# Patient Record
Sex: FEMALE | Race: WHITE | NOT HISPANIC OR LATINO | Employment: OTHER | URBAN - METROPOLITAN AREA
[De-identification: names, ages, dates, MRNs, and addresses within clinical notes are randomized per-mention and may not be internally consistent; named-entity substitution may affect disease eponyms.]

---

## 2022-06-12 ENCOUNTER — OFFICE VISIT (OUTPATIENT)
Dept: URGENT CARE | Age: 62
End: 2022-06-12
Payer: COMMERCIAL

## 2022-06-12 VITALS
DIASTOLIC BLOOD PRESSURE: 92 MMHG | SYSTOLIC BLOOD PRESSURE: 146 MMHG | OXYGEN SATURATION: 98 % | TEMPERATURE: 98.1 F | HEIGHT: 62 IN | HEART RATE: 84 BPM | BODY MASS INDEX: 38.46 KG/M2 | RESPIRATION RATE: 20 BRPM | WEIGHT: 209 LBS

## 2022-06-12 DIAGNOSIS — J20.9 ACUTE BRONCHITIS, UNSPECIFIED ORGANISM: Primary | ICD-10-CM

## 2022-06-12 PROCEDURE — 99213 OFFICE O/P EST LOW 20 MIN: CPT

## 2022-06-12 RX ORDER — LIOTHYRONINE SODIUM 5 UG/1
TABLET ORAL
COMMUNITY
Start: 2022-03-05

## 2022-06-12 RX ORDER — AMLODIPINE BESYLATE 5 MG/1
TABLET ORAL
COMMUNITY
Start: 2022-04-17

## 2022-06-12 RX ORDER — LAMOTRIGINE 150 MG/1
TABLET ORAL
COMMUNITY
Start: 2022-03-10

## 2022-06-12 RX ORDER — FLUTICASONE PROPIONATE 50 MCG
1 SPRAY, SUSPENSION (ML) NASAL DAILY
COMMUNITY

## 2022-06-12 RX ORDER — ALBUTEROL SULFATE 90 UG/1
2 AEROSOL, METERED RESPIRATORY (INHALATION) EVERY 6 HOURS PRN
COMMUNITY

## 2022-06-12 RX ORDER — LEVOTHYROXINE SODIUM 0.1 MG/1
TABLET ORAL
COMMUNITY
Start: 2022-05-08

## 2022-06-12 RX ORDER — AZITHROMYCIN 250 MG/1
TABLET, FILM COATED ORAL
Qty: 6 TABLET | Refills: 0 | Status: SHIPPED | OUTPATIENT
Start: 2022-06-12 | End: 2022-06-16

## 2022-06-12 RX ORDER — PREDNISONE 20 MG/1
20 TABLET ORAL DAILY
Qty: 5 TABLET | Refills: 0 | Status: SHIPPED | OUTPATIENT
Start: 2022-06-12 | End: 2022-06-17

## 2022-06-12 NOTE — PROGRESS NOTES
[facility]        NAME: Katarzyna Lutz is a 58 y o  female  : 1960    MRN: [id number]  DATE: 2022  TIME: 10:06 AM    Assessment and Plan   Acute bronchitis, unspecified organism [J20 9]  1  Acute bronchitis, unspecified organism  azithromycin (ZITHROMAX) 250 mg tablet    predniSONE 20 mg tablet         Patient Instructions     Steroids and antibiotics as discussed  Follow up with PCP in 3-5 days  Proceed to  ER if symptoms worsen  Chief Complaint     Chief Complaint   Patient presents with    Cough     Patient been sick since Wednesday with a cough- been using her inhaler more often- denies fever but has post nasal drip         History of Present Illness       Patient presenting for evaluation of worsening cough  Patient states that she began with a cough 5 days ago, and it has become productive of green sputum  Patient states that she has taken at home COVID test, and has negative test results  She denies any chest pain, shortness of breath, fevers or chills, but states that she has been having chest tightness and cold sweats  Patient states that she is diagnosed with early-onset COPD, and has a history of smoking  She states that she is no longer a smoker  She states that she has been taking albuterol for her chest tightness and wheezing, and is providing relief of the symptoms  She denies any relieving factors of her cough  Cough  Associated symptoms include chills (Cold sweats) and wheezing  Pertinent negatives include no chest pain, ear pain, fever, rash, sore throat or shortness of breath  Review of Systems   Review of Systems   Constitutional: Positive for chills (Cold sweats)  Negative for fever  HENT: Negative for ear pain and sore throat  Eyes: Negative for pain and visual disturbance  Respiratory: Positive for cough, chest tightness and wheezing  Negative for shortness of breath  Cardiovascular: Negative for chest pain and palpitations  Gastrointestinal: Negative for abdominal pain and vomiting  Genitourinary: Negative for dysuria and hematuria  Musculoskeletal: Negative for arthralgias and back pain  Skin: Negative for color change and rash  Neurological: Negative for seizures and syncope  All other systems reviewed and are negative  Current Medications       Current Outpatient Medications:     albuterol (PROVENTIL HFA,VENTOLIN HFA) 90 mcg/act inhaler, Inhale 2 puffs every 6 (six) hours as needed for wheezing, Disp: , Rfl:     azithromycin (ZITHROMAX) 250 mg tablet, Take 2 tablets today then 1 tablet daily x 4 days, Disp: 6 tablet, Rfl: 0    fluticasone (FLONASE) 50 mcg/act nasal spray, 1 spray into each nostril daily, Disp: , Rfl:     predniSONE 20 mg tablet, Take 1 tablet (20 mg total) by mouth daily for 5 days, Disp: 5 tablet, Rfl: 0    amLODIPine (NORVASC) 5 mg tablet, , Disp: , Rfl:     lamoTRIgine (LaMICtal) 150 MG tablet, , Disp: , Rfl:     levothyroxine 100 mcg tablet, , Disp: , Rfl:     liothyronine (CYTOMEL) 5 mcg tablet, , Disp: , Rfl:     Current Allergies     Allergies as of 06/12/2022    (No Known Allergies)            The following portions of the patient's history were reviewed and updated as appropriate: allergies, current medications, past family history, past medical history, past social history, past surgical history and problem list      No past medical history on file  No past surgical history on file  No family history on file  Medications have been verified  Objective   /92 (BP Location: Left arm, Patient Position: Sitting, Cuff Size: Standard)   Pulse 84   Temp 98 1 °F (36 7 °C)   Resp 20   Ht 5' 2" (1 575 m)   Wt 94 8 kg (209 lb)   SpO2 98%   BMI 38 23 kg/m²        Physical Exam     Physical Exam  Vitals and nursing note reviewed  Constitutional:       General: She is not in acute distress  Appearance: Normal appearance  She is normal weight   She is not ill-appearing, toxic-appearing or diaphoretic  HENT:      Head: Normocephalic and atraumatic  Right Ear: Tympanic membrane normal       Left Ear: Tympanic membrane normal       Nose: Congestion present  No rhinorrhea  Mouth/Throat:      Mouth: Mucous membranes are moist       Pharynx: Oropharynx is clear  No oropharyngeal exudate or posterior oropharyngeal erythema  Eyes:      Extraocular Movements: Extraocular movements intact  Conjunctiva/sclera: Conjunctivae normal       Pupils: Pupils are equal, round, and reactive to light  Cardiovascular:      Rate and Rhythm: Normal rate and regular rhythm  Pulses: Normal pulses  Heart sounds: Normal heart sounds  No murmur heard  No friction rub  No gallop  Pulmonary:      Effort: Pulmonary effort is normal  No respiratory distress  Breath sounds: No stridor  Examination of the right-upper field reveals decreased breath sounds  Examination of the left-upper field reveals decreased breath sounds  Decreased breath sounds present  No wheezing, rhonchi or rales  Chest:      Chest wall: No tenderness  Abdominal:      General: Abdomen is flat  Bowel sounds are normal       Palpations: Abdomen is soft  Tenderness: There is no abdominal tenderness  There is no guarding or rebound  Musculoskeletal:         General: No tenderness  Normal range of motion  Cervical back: Normal range of motion and neck supple  Skin:     General: Skin is warm and dry  Capillary Refill: Capillary refill takes less than 2 seconds  Neurological:      General: No focal deficit present  Mental Status: She is alert and oriented to person, place, and time     Psychiatric:         Mood and Affect: Mood normal          Behavior: Behavior normal

## 2022-06-12 NOTE — PATIENT INSTRUCTIONS
Please take steroid and antibiotic for the next 5 days  1   Drink plenty fluids  2   Take probiotics [i e  Yogurt, Acidophilus, Florastor (liquid)] daily  3   Over-the-counter medications as needed for symptomatic care  4    Advance activities as tolerated  5    Follow-up with your primary care physician in 3-4 days  6   Go to emergency room if symptoms are worsening      7   Use a humidifier at bedtime